# Patient Record
Sex: FEMALE | Race: WHITE | NOT HISPANIC OR LATINO | URBAN - METROPOLITAN AREA
[De-identification: names, ages, dates, MRNs, and addresses within clinical notes are randomized per-mention and may not be internally consistent; named-entity substitution may affect disease eponyms.]

---

## 2024-07-03 ENCOUNTER — OFFICE VISIT (OUTPATIENT)
Dept: URBAN - METROPOLITAN AREA CLINIC 12 | Facility: CLINIC | Age: 49
End: 2024-07-03
Payer: COMMERCIAL

## 2024-07-03 VITALS
HEIGHT: 63 IN | SYSTOLIC BLOOD PRESSURE: 132 MMHG | HEART RATE: 92 BPM | BODY MASS INDEX: 45.18 KG/M2 | WEIGHT: 255 LBS | DIASTOLIC BLOOD PRESSURE: 84 MMHG | TEMPERATURE: 99.1 F

## 2024-07-03 DIAGNOSIS — K20.0 EOSINOPHILIC ESOPHAGITIS: ICD-10-CM

## 2024-07-03 DIAGNOSIS — K21.9 GERD: ICD-10-CM

## 2024-07-03 DIAGNOSIS — R19.7 DIARRHEA, UNSPECIFIED TYPE: ICD-10-CM

## 2024-07-03 PROCEDURE — 99214 OFFICE O/P EST MOD 30 MIN: CPT | Performed by: INTERNAL MEDICINE

## 2024-07-03 RX ORDER — PANTOPRAZOLE SODIUM 40 MG/1
1 TABLET TABLET, DELAYED RELEASE ORAL ONCE A DAY
Qty: 90 | Refills: 1 | Status: ON HOLD | COMMUNITY
Start: 2023-04-26

## 2024-07-03 RX ORDER — FLUTICASONE PROPIONATE 220 UG/1
2 PUFFS AEROSOL, METERED RESPIRATORY (INHALATION) TWICE A DAY
Qty: 1 | Refills: 2 | Status: ON HOLD | COMMUNITY
Start: 2023-04-26

## 2024-07-03 RX ORDER — HYOSCYAMINE SULFATE 0.12 MG/1
1 TABLET UNDER THE TONGUE AND ALLOW TO DISSOLVE  AS NEEDED TABLET, ORALLY DISINTEGRATING ORAL THREE TIMES A DAY
Qty: 90 | Refills: 1 | OUTPATIENT
Start: 2024-07-03

## 2024-07-03 RX ORDER — PANTOPRAZOLE SODIUM 40 MG/1
1 TABLET TABLET, DELAYED RELEASE ORAL ONCE A DAY
Qty: 90 | Refills: 1 | Status: ON HOLD | COMMUNITY
Start: 2023-07-26

## 2024-07-03 NOTE — HPI-TODAY'S VISIT:
The patient presents with a chief complaint of persistent diarrhea for the past month and a half, occurring at least five times a day and consisting of liquid, non-solid stool. The patient reports urgency and the need to be near a bathroom. They have not been taking any medication for the diarrhea, but occasionally take Zofran for nausea. The patient recently restarted Wellbutrin and fluoxetine and began using a new asthma medication, Wixela.  The patient's appetite has decreased, and they can only consume one meal a day without feeling sick. They experience frequent burping and dark urine. The patient's reflux symptoms have improved with omeprazole, but they still experience acid reflux at night. They take omeprazole in the evening before dinner. The patient occasionally experiences abdominal cramping and an upset stomach, but no pain. Their stool has been black, green, brown, yellowish-brown, and mucusy, with the black stool possibly attributed to an iron supplement. The patient's swallowing has improved since a previous procedure.  The patient has not traveled outside the country recently and has had their thyroid checked in the past with no issues. There is no family history of intestinal issues. The patient has a history of gastric issues.

## 2024-07-03 NOTE — PREVIOUS WORKUP REVIEWED EXTERNAL MEDICAL RECORD
. ENDOSCOPIES   colonoscopy 4/2023- 1 flat transverse polyp SSA 2015- colonoscopy- diverticulosis, ow normal, nl TI  2015- egd normal, negative biopsies 2017- eus with Dr. Antoine of pancreas- normal pd, cbd, no pancreatic changes, endoscopy normal 2017- pillcam for BHARTI normal egd 7/2023-  distal esophagus 16 , proximal esophagus 15 eos egd 4/2023- grade c esophagitis  LABS  IMAGES

## 2024-07-04 LAB
A/G RATIO: 1.4
ALBUMIN: 3.8
ALKALINE PHOSPHATASE: 56
ALT (SGPT): 15
AST (SGOT): 17
BILIRUBIN, TOTAL: 0.3
BUN/CREATININE RATIO: (no result)
BUN: 8
CALCIUM: 8.7
CARBON DIOXIDE, TOTAL: 24
CHLORIDE: 107
CREATININE: 0.68
EGFR: 109
GLOBULIN, TOTAL: 2.8
GLUCOSE: 95
HEMATOCRIT: 32.4
HEMOGLOBIN: 10.2
MCH: 24.4
MCHC: 31.5
MCV: 77.5
MPV: 10.8
PLATELET COUNT: 409
POTASSIUM: 4.7
PROTEIN, TOTAL: 6.6
RDW: 14.7
RED BLOOD CELL COUNT: 4.18
SODIUM: 140
WHITE BLOOD CELL COUNT: 7.9

## 2024-07-09 ENCOUNTER — TELEPHONE ENCOUNTER (OUTPATIENT)
Dept: URBAN - METROPOLITAN AREA CLINIC 12 | Facility: CLINIC | Age: 49
End: 2024-07-09

## 2024-07-12 ENCOUNTER — LAB OUTSIDE AN ENCOUNTER (OUTPATIENT)
Dept: URBAN - METROPOLITAN AREA CLINIC 23 | Facility: CLINIC | Age: 49
End: 2024-07-12

## 2024-07-14 ENCOUNTER — WEB ENCOUNTER (OUTPATIENT)
Dept: URBAN - METROPOLITAN AREA CLINIC 12 | Facility: CLINIC | Age: 49
End: 2024-07-14

## 2024-07-15 ENCOUNTER — WEB ENCOUNTER (OUTPATIENT)
Dept: URBAN - METROPOLITAN AREA CLINIC 12 | Facility: CLINIC | Age: 49
End: 2024-07-15

## 2024-07-18 LAB
ADENOVIRUS F 40/41: NOT DETECTED
CALPROTECTIN, STOOL - QDX: (no result)
CAMPYLOBACTER: NOT DETECTED
CLOSTRIDIUM DIFFICILE: NOT DETECTED
ENTAMOEBA HISTOLYTICA: NOT DETECTED
ENTEROAGGREGATIVE E.COLI: NOT DETECTED
ENTEROTOXIGENIC E.COLI: NOT DETECTED
ESCHERICHIA COLI O157: NOT DETECTED
GIARDIA LAMBLIA: NOT DETECTED
NOROVIRUS GI/GII: NOT DETECTED
PANCREATICELASTASE ELISA, STOOL: (no result)
ROTAVIRUS A: NOT DETECTED
SALMONELLA SPP.: NOT DETECTED
SHIGA-LIKE TOXIN PRODUCING E.COLI: NOT DETECTED
SHIGELLA SPP. / ENTEROINVASIVE E.COLI: NOT DETECTED
VIBRIO PARAHAEMOLYTICUS: NOT DETECTED
VIBRIO SPP.: NOT DETECTED
YERSINIA ENTEROCOLITICA: NOT DETECTED

## 2024-07-20 LAB
FERRITIN, SERUM: 6
HEMATOCRIT: 31.5
HEMOGLOBIN: 10.1
IRON BIND.CAP.(TIBC): 510
IRON SATURATION: 4
IRON: 18
MCH: 24.3
MCHC: 32.1
MCV: 75.7
MPV: 11.2
PLATELET COUNT: 414
RDW: 15.2
RED BLOOD CELL COUNT: 4.16
WHITE BLOOD CELL COUNT: 7.4

## 2024-07-24 ENCOUNTER — TELEPHONE ENCOUNTER (OUTPATIENT)
Dept: URBAN - METROPOLITAN AREA CLINIC 12 | Facility: CLINIC | Age: 49
End: 2024-07-24

## 2024-08-04 ENCOUNTER — WEB ENCOUNTER (OUTPATIENT)
Dept: URBAN - METROPOLITAN AREA CLINIC 12 | Facility: CLINIC | Age: 49
End: 2024-08-04

## 2024-08-05 ENCOUNTER — WEB ENCOUNTER (OUTPATIENT)
Dept: URBAN - METROPOLITAN AREA CLINIC 12 | Facility: CLINIC | Age: 49
End: 2024-08-05

## 2024-08-07 LAB
A/G RATIO: 1.3
ALBUMIN: 3.9
ALKALINE PHOSPHATASE: 73
ALT (SGPT): 30
AST (SGOT): 20
BILIRUBIN, TOTAL: 0.4
BUN/CREATININE RATIO: (no result)
BUN: 12
C-REACTIVE PROTEIN, QUANT: 24.1
CALCIUM: 9.3
CARBON DIOXIDE, TOTAL: 25
CHLORIDE: 102
CREATININE: 0.61
EGFR: 112
GLOBULIN, TOTAL: 3.1
GLUCOSE: 101
HEMATOCRIT: 33.4
HEMOGLOBIN: 10.5
IMMUNOGLOBULIN A: 272
INTERPRETATION: (no result)
MCH: 24.4
MCHC: 31.4
MCV: 77.7
MPV: 11.2
PLATELET COUNT: 460
POTASSIUM: 4.9
PROTEIN, TOTAL: 7
RDW: 14.9
RED BLOOD CELL COUNT: 4.3
SODIUM: 137
TISSUE TRANSGLUTAMINASE AB, IGA: <1
WHITE BLOOD CELL COUNT: 7.7

## 2024-09-03 ENCOUNTER — WEB ENCOUNTER (OUTPATIENT)
Dept: URBAN - METROPOLITAN AREA CLINIC 12 | Facility: CLINIC | Age: 49
End: 2024-09-03